# Patient Record
Sex: FEMALE | Race: WHITE | NOT HISPANIC OR LATINO | Employment: FULL TIME | URBAN - METROPOLITAN AREA
[De-identification: names, ages, dates, MRNs, and addresses within clinical notes are randomized per-mention and may not be internally consistent; named-entity substitution may affect disease eponyms.]

---

## 2023-06-19 ENCOUNTER — OFFICE VISIT (OUTPATIENT)
Dept: PRIMARY CARE | Facility: CLINIC | Age: 48
End: 2023-06-19
Payer: COMMERCIAL

## 2023-06-19 VITALS
SYSTOLIC BLOOD PRESSURE: 128 MMHG | HEART RATE: 57 BPM | OXYGEN SATURATION: 98 % | BODY MASS INDEX: 29.63 KG/M2 | DIASTOLIC BLOOD PRESSURE: 78 MMHG | WEIGHT: 162 LBS

## 2023-06-19 DIAGNOSIS — R35.0 FREQUENCY OF URINATION: ICD-10-CM

## 2023-06-19 DIAGNOSIS — N30.01 ACUTE CYSTITIS WITH HEMATURIA: Primary | ICD-10-CM

## 2023-06-19 PROBLEM — Z20.822 SUSPECTED COVID-19 VIRUS INFECTION: Status: ACTIVE | Noted: 2023-06-19

## 2023-06-19 PROBLEM — E04.9 ENLARGED THYROID: Status: ACTIVE | Noted: 2023-06-19

## 2023-06-19 PROBLEM — E78.5 HYPERLIPIDEMIA: Status: ACTIVE | Noted: 2023-06-19

## 2023-06-19 PROBLEM — M75.81 TENDINITIS OF RIGHT ROTATOR CUFF: Status: ACTIVE | Noted: 2023-06-19

## 2023-06-19 PROBLEM — R92.30 DENSE BREAST TISSUE ON MAMMOGRAM: Status: ACTIVE | Noted: 2023-06-19
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
## 2023-06-19 PROBLEM — H69.90 EUSTACHIAN TUBE DISORDER: Status: ACTIVE | Noted: 2023-06-19

## 2023-06-19 PROBLEM — R92.8 ABNORMAL MAMMOGRAM: Status: ACTIVE | Noted: 2023-06-19

## 2023-06-19 PROBLEM — N84.1 ENDOCERVICAL POLYP: Status: ACTIVE | Noted: 2023-06-19

## 2023-06-19 PROBLEM — H91.90 DECREASED HEARING: Status: ACTIVE | Noted: 2023-06-19

## 2023-06-19 PROBLEM — B37.0 CANDIDIASIS OF MOUTH: Status: ACTIVE | Noted: 2023-06-19

## 2023-06-19 PROBLEM — T78.40XA ALLERGY: Status: ACTIVE | Noted: 2023-06-19

## 2023-06-19 PROBLEM — R00.2 HEART PALPITATIONS: Status: ACTIVE | Noted: 2023-06-19

## 2023-06-19 PROBLEM — L65.9 HAIR LOSS: Status: ACTIVE | Noted: 2023-06-19

## 2023-06-19 PROBLEM — N63.20 BREAST MASS, LEFT: Status: ACTIVE | Noted: 2023-06-19

## 2023-06-19 LAB
POC APPEARANCE, URINE: CLEAR
POC BILIRUBIN, URINE: NEGATIVE
POC BLOOD, URINE: ABNORMAL
POC COLOR, URINE: YELLOW
POC GLUCOSE, URINE: NEGATIVE MG/DL
POC KETONES, URINE: NEGATIVE MG/DL
POC LEUKOCYTES, URINE: NEGATIVE
POC NITRITE,URINE: NEGATIVE
POC PH, URINE: 7 PH
POC PROTEIN, URINE: NEGATIVE MG/DL
POC SPECIFIC GRAVITY, URINE: 1.01
POC UROBILINOGEN, URINE: 0.2 EU/DL

## 2023-06-19 PROCEDURE — 87086 URINE CULTURE/COLONY COUNT: CPT

## 2023-06-19 PROCEDURE — 81002 URINALYSIS NONAUTO W/O SCOPE: CPT | Performed by: FAMILY MEDICINE

## 2023-06-19 PROCEDURE — 99213 OFFICE O/P EST LOW 20 MIN: CPT | Performed by: FAMILY MEDICINE

## 2023-06-19 RX ORDER — CHOLECALCIFEROL (VITAMIN D3) 50 MCG
TABLET ORAL
COMMUNITY
End: 2024-01-12 | Stop reason: ENTERED-IN-ERROR

## 2023-06-19 RX ORDER — CIPROFLOXACIN 500 MG/1
500 TABLET ORAL 2 TIMES DAILY
Qty: 10 TABLET | Refills: 0 | Status: SHIPPED | OUTPATIENT
Start: 2023-06-19 | End: 2023-06-24

## 2023-06-19 NOTE — PROGRESS NOTES
"Subjective   Patient ID: Aida Moreau is a 48 y.o. female who presents for blood in urine.     HPI   She had a severe sharp pain in her right low back that woke her from her sleep and lasted for 15 minutes. She states it was worse than labor pains.  She did not take anything for relief. It subsided into a dull ache. When she went to the bathroom the second time in the morning there was blood in the urine. She still has  blood in her urine. She went on a baseball trip she was drinking a lot of water, and the urine was still cloudy. She denies burning w urination but has frequency. She felt like she is \"on fire inside\" when traveling. She denies chills and fever. She reports that she did not feel well over the last few days, which she attributed to traveling.     She has never been a smoker.       Review of Systems    Objective   /78   Pulse 57   Wt 73.5 kg (162 lb)   SpO2 98%   BMI 29.63 kg/m²     Physical Exam  Constitutional:       Appearance: Normal appearance.   Cardiovascular:      Rate and Rhythm: Normal rate and regular rhythm.      Pulses: Normal pulses.      Heart sounds: Normal heart sounds.   Pulmonary:      Effort: Pulmonary effort is normal.      Breath sounds: Normal breath sounds.   Abdominal:      General: Bowel sounds are normal.      Palpations: Abdomen is soft. There is no mass.      Tenderness: There is no abdominal tenderness. There is no right CVA tenderness or left CVA tenderness.   Musculoskeletal:         General: No tenderness. Normal range of motion.      Cervical back: Normal range of motion.   Skin:     General: Skin is warm and dry.   Neurological:      Mental Status: She is alert and oriented to person, place, and time.   Psychiatric:         Mood and Affect: Mood normal.         Thought Content: Thought content normal.         Judgment: Judgment normal.         Assessment/Plan   Problem List Items Addressed This Visit    None  Visit Diagnoses       Acute cystitis with " hematuria    -  Primary    Relevant Medications    ciprofloxacin (Cipro) 500 mg tablet    Other Relevant Orders    POCT UA (nonautomated) manually resulted (Completed)    Frequency of urination        Relevant Orders    Urinalysis Microscopic Only    Urine Culture          Hematuria  Urine Sample provided of Urinalysis and culture.   Start on Cipro 500 twice daily for 5 days. Prescription sent to pharmacy.   If culture is negative, will stop antibiotic and refer to urology for investigation of hematuria.   SE profile of medication discussed    Question kidney stone as cause of back pain    Recheck urine after antibiotic if culture pos for infection       Scribe Attestation  By signing my name below, IDawn Scribe   attest that this documentation has been prepared under the direction and in the presence of Josiane Castaneda DO.

## 2023-06-19 NOTE — PATIENT INSTRUCTIONS
Hematuria  Urine Sample provided of Urinalysis and culture.   Start on Cipro 500 twice daily for 10 days. Prescription sent to pharmacy.   If culture is negative, will stop antibiotic and refer to urology.     Sulfur Allergy  Advised to call allergist for opinion before traveling.

## 2023-06-20 LAB — URINE CULTURE: NORMAL

## 2023-06-23 DIAGNOSIS — R31.0 GROSS HEMATURIA: Primary | ICD-10-CM

## 2023-06-26 ENCOUNTER — LAB (OUTPATIENT)
Dept: LAB | Facility: LAB | Age: 48
End: 2023-06-26
Payer: COMMERCIAL

## 2023-06-26 DIAGNOSIS — R31.0 GROSS HEMATURIA: ICD-10-CM

## 2023-06-26 LAB
RBC, URINE: 422 /HPF (ref 0–5)
SQUAMOUS EPITHELIAL CELLS, URINE: <1 /HPF
WBC, URINE: 4 /HPF (ref 0–5)

## 2023-06-26 PROCEDURE — 81001 URINALYSIS AUTO W/SCOPE: CPT

## 2023-06-28 ENCOUNTER — TELEPHONE (OUTPATIENT)
Dept: PRIMARY CARE | Facility: CLINIC | Age: 48
End: 2023-06-28

## 2023-06-28 DIAGNOSIS — R31.0 GROSS HEMATURIA: Primary | ICD-10-CM

## 2023-06-28 NOTE — TELEPHONE ENCOUNTER
PATIENT WENT AND HAD LABS DONE. SHE STATES THAT THERE IS STILL BLOOD IN HER URINE. PLEASE ADVISE NEXT STEPS.

## 2023-06-29 LAB
CREATININE (MG/DL) IN SER/PLAS: 0.88 MG/DL (ref 0.5–1.05)
GFR FEMALE: 82 ML/MIN/1.73M2

## 2023-09-07 ENCOUNTER — HOSPITAL ENCOUNTER (OUTPATIENT)
Dept: DATA CONVERSION | Facility: HOSPITAL | Age: 48
End: 2023-09-07
Attending: UROLOGY | Admitting: UROLOGY
Payer: COMMERCIAL

## 2023-09-07 DIAGNOSIS — N13.2 HYDRONEPHROSIS WITH RENAL AND URETERAL CALCULOUS OBSTRUCTION: ICD-10-CM

## 2023-09-07 DIAGNOSIS — N20.0 CALCULUS OF KIDNEY: ICD-10-CM

## 2023-09-13 LAB
CALCULI COMPOSITION: NORMAL
CALCULI DESCRIPTION: NORMAL
CALCULI MASS: 8 MG

## 2023-09-29 VITALS — WEIGHT: 151.24 LBS | BODY MASS INDEX: 27.83 KG/M2 | HEIGHT: 62 IN

## 2023-09-30 NOTE — H&P
History & Physical Reviewed:   Pregnant/Lactating:  ·  Are You Pregnant no   ·  Are You Currently Breastfeeding no     I have reviewed the History and Physical dated:  07-Sep-2023   History and Physical reviewed and relevant findings noted. Patient examined to review pertinent physical  findings.: No significant changes   Home Medications Reviewed: no changes noted   Allergies Reviewed: no changes noted       ERAS (Enhanced Recovery After Surgery):  ·  ERAS Patient: no     Consent:   COVID-19 Consent:  ·  COVID-19 Risk Consent Surgeon has reviewed key risks related to the risk of [place] COVID-19 and if they contract COVID-19 what the risks are.     Attestation:   Note Completion:  I am a:  Resident/Fellow   Attending Attestation I saw and evaluated the patient.  I personally obtained the key and critical portions of the history and physical exam or was physically present for key and  critical portions performed by the resident/fellow. I reviewed the resident/fellow?s documentation and discussed the patient with the resident/fellow.  I agree with the resident/fellow?s medical decision making as documented in the note.     I personally evaluated the patient on 07-Sep-2023         Electronic Signatures:  Sergo Puente (Resident))  (Signed 07-Sep-2023 05:35)   Authored: History & Physical Reviewed, ERAS, Consent,  Note Completion  Javon Westfall)  (Signed 07-Sep-2023 16:26)   Authored: Note Completion   Co-Signer: History & Physical Reviewed, ERAS, Consent, Note Completion      Last Updated: 07-Sep-2023 16:26 by Javon Westfall)

## 2023-10-01 NOTE — OP NOTE
PROCEDURE DETAILS    Preoperative Diagnosis:  Right UVJ stone, hydronephrosis  Postoperative Diagnosis:  Right UVJ stone, hydronephrosis  Surgeon: Dr. Westfall   Resident/Fellow/Other Assistant: Dr. Bobby Ham, Dr. Sergo Puente     Procedure:  Cystoscopy, right retrograde pyelogram with intraoperative interpretation, right ureteroscopy, laser lithotripsy and stone removal with basket  right stent placement  Anesthesia: General  Estimated Blood Loss: 5 cc  Findings: UOs in normal orthotopic position, no bladder masses or stones identified   Filling defect at distal right UO. Stone seen at the distal right ureter  Specimens(s) Collected: yes,  Stone fragments   Complications: None  Urine Output: Lost to field   Drains and/or Catheters: 6x26fr JJ right ureteral stent  Patient Returned To/Condition: PACU in stable condition         Operative Report:   Patient consented to procedure in preoperative area. Risks and benefits discussed. Patient was marked on the right side. Allergies were reviewed and preoperative  antibiotics were administered. Patient was brought to the operating room and placed in supine position on the operating room table. A timeout was performed. All were in agreement. Patient underwent general anesthesia without complication. They were repositioned  in dorsal lithotomy and prepped and draped in the usual sterile fashion. A 21 fr rigid cystoscope was used to perform cystourethroscopy. Urethra was normal. bilateral UOs were in normal orthotopic position. No masses or stones were identified.  Through  the right UO, a sensor wire was placed through a dual lumen catheter to the level of the kidney as confirmed on fluoroscopy. Retrograde pyelogram was performed.     Retrograde pyelogram interpretation: Ureter had normal caliber and course. Filling defect was noted distal ureter    A Then the dual lumen was removed. The cystoscope was removed while the wire was secured as a safety wire.  A semi-rigid ureteroscopy was advanced  next to the wire under direct vision. Stone was noted in the distal ureter. A 200 micron holmium laser fiber was used to fragment all stone. A basket was then used to remove remaining stone fragments. Ureteroscope was withdrawn under direct visualization.  No ureteral stones were noted on ureteroscopy. A 6x 26 JJ stent was placed over the safety wire with proximal curl noted in kidney on fluoro and distal curl in the bladder on direct visualization. Bladder was drained, instruments removed. This concluded  the procedure. Patient was awoken from anesthesia without complication and transferred to PACU in stable condition.                         Attestation:   Note Completion:  Attending Attestation I was present for the entire procedure    I am a: Resident/Fellow         Electronic Signatures:  Sergo Puente (Resident))  (Signed 07-Sep-2023 16:43)   Authored: Post-Operative Note, Chart Review, Note Completion  Javon Westfall)  (Signed 08-Sep-2023 14:13)   Authored: Post-Operative Note, Chart Review, Note Completion   Co-Signer: Post-Operative Note, Chart Review, Note Completion      Last Updated: 08-Sep-2023 14:13 by Javon Westfall)

## 2023-12-13 ENCOUNTER — TELEPHONE (OUTPATIENT)
Dept: PRIMARY CARE | Facility: CLINIC | Age: 48
End: 2023-12-13
Payer: COMMERCIAL

## 2023-12-14 DIAGNOSIS — Z00.00 PHYSICAL EXAM: Primary | ICD-10-CM

## 2023-12-14 DIAGNOSIS — E55.9 VITAMIN D DEFICIENCY: ICD-10-CM

## 2023-12-15 NOTE — TELEPHONE ENCOUNTER
Spoke to patient to let her know orders in and she can go at anytime and let her know results will post to her chart before being reviewed at her appt

## 2024-01-03 ENCOUNTER — LAB (OUTPATIENT)
Dept: LAB | Facility: LAB | Age: 49
End: 2024-01-03
Payer: COMMERCIAL

## 2024-01-03 DIAGNOSIS — Z00.00 PHYSICAL EXAM: ICD-10-CM

## 2024-01-03 DIAGNOSIS — E55.9 VITAMIN D DEFICIENCY: ICD-10-CM

## 2024-01-03 LAB
25(OH)D3 SERPL-MCNC: 27 NG/ML (ref 30–100)
ALBUMIN SERPL BCP-MCNC: 4.3 G/DL (ref 3.4–5)
ALP SERPL-CCNC: 59 U/L (ref 33–110)
ALT SERPL W P-5'-P-CCNC: 40 U/L (ref 7–45)
ANION GAP SERPL CALC-SCNC: 13 MMOL/L (ref 10–20)
AST SERPL W P-5'-P-CCNC: 24 U/L (ref 9–39)
BASOPHILS # BLD AUTO: 0.04 X10*3/UL (ref 0–0.1)
BASOPHILS NFR BLD AUTO: 0.9 %
BILIRUB SERPL-MCNC: 0.4 MG/DL (ref 0–1.2)
BUN SERPL-MCNC: 19 MG/DL (ref 6–23)
CALCIUM SERPL-MCNC: 9.5 MG/DL (ref 8.6–10.6)
CHLORIDE SERPL-SCNC: 106 MMOL/L (ref 98–107)
CHOLEST SERPL-MCNC: 255 MG/DL (ref 0–199)
CHOLESTEROL/HDL RATIO: 3
CO2 SERPL-SCNC: 24 MMOL/L (ref 21–32)
CREAT SERPL-MCNC: 0.73 MG/DL (ref 0.5–1.05)
EOSINOPHIL # BLD AUTO: 0.08 X10*3/UL (ref 0–0.7)
EOSINOPHIL NFR BLD AUTO: 1.8 %
ERYTHROCYTE [DISTWIDTH] IN BLOOD BY AUTOMATED COUNT: 11.5 % (ref 11.5–14.5)
GFR SERPL CREATININE-BSD FRML MDRD: >90 ML/MIN/1.73M*2
GLUCOSE SERPL-MCNC: 105 MG/DL (ref 74–99)
HCT VFR BLD AUTO: 42.6 % (ref 36–46)
HDLC SERPL-MCNC: 85.8 MG/DL
HGB BLD-MCNC: 14.1 G/DL (ref 12–16)
IMM GRANULOCYTES # BLD AUTO: 0.01 X10*3/UL (ref 0–0.7)
IMM GRANULOCYTES NFR BLD AUTO: 0.2 % (ref 0–0.9)
LDLC SERPL CALC-MCNC: 148 MG/DL
LYMPHOCYTES # BLD AUTO: 1.28 X10*3/UL (ref 1.2–4.8)
LYMPHOCYTES NFR BLD AUTO: 29.4 %
MCH RBC QN AUTO: 29 PG (ref 26–34)
MCHC RBC AUTO-ENTMCNC: 33.1 G/DL (ref 32–36)
MCV RBC AUTO: 88 FL (ref 80–100)
MONOCYTES # BLD AUTO: 0.31 X10*3/UL (ref 0.1–1)
MONOCYTES NFR BLD AUTO: 7.1 %
NEUTROPHILS # BLD AUTO: 2.64 X10*3/UL (ref 1.2–7.7)
NEUTROPHILS NFR BLD AUTO: 60.6 %
NON HDL CHOLESTEROL: 169 MG/DL (ref 0–149)
NRBC BLD-RTO: 0 /100 WBCS (ref 0–0)
PLATELET # BLD AUTO: 266 X10*3/UL (ref 150–450)
POTASSIUM SERPL-SCNC: 4.5 MMOL/L (ref 3.5–5.3)
PROT SERPL-MCNC: 6.8 G/DL (ref 6.4–8.2)
RBC # BLD AUTO: 4.87 X10*6/UL (ref 4–5.2)
SODIUM SERPL-SCNC: 138 MMOL/L (ref 136–145)
TRIGL SERPL-MCNC: 105 MG/DL (ref 0–149)
TSH SERPL-ACNC: 1.77 MIU/L (ref 0.44–3.98)
VLDL: 21 MG/DL (ref 0–40)
WBC # BLD AUTO: 4.4 X10*3/UL (ref 4.4–11.3)

## 2024-01-03 PROCEDURE — 84443 ASSAY THYROID STIM HORMONE: CPT

## 2024-01-03 PROCEDURE — 80061 LIPID PANEL: CPT

## 2024-01-03 PROCEDURE — 85025 COMPLETE CBC W/AUTO DIFF WBC: CPT

## 2024-01-03 PROCEDURE — 82306 VITAMIN D 25 HYDROXY: CPT

## 2024-01-03 PROCEDURE — 80053 COMPREHEN METABOLIC PANEL: CPT

## 2024-01-03 PROCEDURE — 36415 COLL VENOUS BLD VENIPUNCTURE: CPT

## 2024-01-12 ENCOUNTER — OFFICE VISIT (OUTPATIENT)
Dept: NEPHROLOGY | Facility: CLINIC | Age: 49
End: 2024-01-12
Payer: COMMERCIAL

## 2024-01-12 VITALS
SYSTOLIC BLOOD PRESSURE: 124 MMHG | BODY MASS INDEX: 32.29 KG/M2 | HEIGHT: 62 IN | WEIGHT: 175.49 LBS | HEART RATE: 67 BPM | DIASTOLIC BLOOD PRESSURE: 79 MMHG | OXYGEN SATURATION: 98 %

## 2024-01-12 DIAGNOSIS — N20.0 NEPHROLITHIASIS: Primary | ICD-10-CM

## 2024-01-12 PROCEDURE — 99203 OFFICE O/P NEW LOW 30 MIN: CPT | Performed by: INTERNAL MEDICINE

## 2024-01-12 NOTE — PROGRESS NOTES
47 yo CF  No urinary symptoms     and 2023  Dr. GIOVANA Marsh once a month  No herbal    PMH  Ozempemic 0.025 q weekly       SOCHx  No tob   No etoh  Lives with  and 2 kids   at elementary school    FMHx  DM, nephrolithiasis, heart disease    ROS OTW Neg 10/14 systems except as above    NAD  Scl AI s inj  MMM, no sores  Deferred secondary to COVID  No edema  No tremor  No rash  Appropriate    Nephrolithiasis    Discussed urine output and dietary salt goals  24 hour urine   Review over virtual visit in 2-4 weeks

## 2024-01-15 ENCOUNTER — LAB (OUTPATIENT)
Dept: LAB | Facility: LAB | Age: 49
End: 2024-01-15
Payer: COMMERCIAL

## 2024-01-15 DIAGNOSIS — N20.0 NEPHROLITHIASIS: ICD-10-CM

## 2024-01-15 PROCEDURE — 82436 ASSAY OF URINE CHLORIDE: CPT

## 2024-01-15 PROCEDURE — 82507 ASSAY OF CITRATE: CPT

## 2024-01-15 PROCEDURE — 82570 ASSAY OF URINE CREATININE: CPT

## 2024-01-15 PROCEDURE — 84540 ASSAY OF URINE/UREA-N: CPT

## 2024-01-15 PROCEDURE — 83986 ASSAY PH BODY FLUID NOS: CPT

## 2024-01-15 PROCEDURE — 84560 ASSAY OF URINE/URIC ACID: CPT

## 2024-01-15 PROCEDURE — 83935 ASSAY OF URINE OSMOLALITY: CPT

## 2024-01-15 PROCEDURE — 84392 ASSAY OF URINE SULFATE: CPT

## 2024-01-15 PROCEDURE — 82140 ASSAY OF AMMONIA: CPT

## 2024-01-15 PROCEDURE — 83735 ASSAY OF MAGNESIUM: CPT

## 2024-01-15 PROCEDURE — 84300 ASSAY OF URINE SODIUM: CPT

## 2024-01-15 PROCEDURE — 83945 ASSAY OF OXALATE: CPT

## 2024-01-15 PROCEDURE — 84133 ASSAY OF URINE POTASSIUM: CPT

## 2024-01-15 PROCEDURE — 82340 ASSAY OF CALCIUM IN URINE: CPT

## 2024-01-19 ENCOUNTER — APPOINTMENT (OUTPATIENT)
Dept: NEPHROLOGY | Facility: CLINIC | Age: 49
End: 2024-01-19

## 2024-01-20 LAB
AMMONIA 24H UR-SRATE: 59 MMOL/24 H (ref 15–56)
BRUSHITE CRYSTAL(MAYO): -1.19 DG
BSA: ABNORMAL 1.73M(2)
CALCIUM 24H UR-MRATE: 216 MG/24 H
CAOX 24H ENGDIFF UR: 1.32 DG
CHLORIDE 24H UR-SRATE: 111 MMOL/24 H (ref 34–286)
CITRATE 24H UR-MRATE: 1088 MG/24 H (ref 335–1191)
COLLECT DURATION TIME UR: 24 H
CREAT 24H UR-MRATE: 1998 MG/24 H (ref 603–1783)
HYDROXYAPATITE 24H ENGDIFF UR: 2.06 DG
MAGNESIUM 24H UR-MRATE: 108 MG/24 H (ref 51–269)
OSMOLALITY 24H UR: 361 MOSM/KG (ref 150–1150)
OXALATE 24H UR-MRATE: 30.8 MG/24 H (ref 9.7–40.5)
OXALATE 24H UR-SRATE: 0.35 MMOL/24 H (ref 0.11–0.46)
PH 24H UR: 5.7 [PH] (ref 4.5–8)
PHOSPHATE 24H UR-MRATE: 1269 MG/24 H (ref 226–1797)
POTASSIUM 24H UR-SRATE: 62 MMOL/24 H (ref 16–105)
PROTEIN CATABOLIC RATE 24H UR-MRATE: 110 G/24 H (ref 56–125)
SODIUM 24H UR-SRATE: 105 MMOL/24 H (ref 22–328)
SPECIMEN VOL 24H UR: 2700 ML
SULFATE 24H UR-SRATE: 20 MMOL/24 H (ref 7–47)
URATE 24H UR-MRATE: 675 MG/24 H (ref 250–750)
URIC ACID CRYSTAL(MAYO): 1.38 DG
URINALYSIS SPECIALIST REVIEW: ABNORMAL
UUN 24H UR-MRATE: 13.6 G/24 H (ref 7–42)

## 2024-02-12 ENCOUNTER — OFFICE VISIT (OUTPATIENT)
Dept: PRIMARY CARE | Facility: CLINIC | Age: 49
End: 2024-02-12
Payer: COMMERCIAL

## 2024-02-12 VITALS
HEART RATE: 70 BPM | RESPIRATION RATE: 16 BRPM | WEIGHT: 170 LBS | OXYGEN SATURATION: 93 % | DIASTOLIC BLOOD PRESSURE: 81 MMHG | SYSTOLIC BLOOD PRESSURE: 124 MMHG | HEIGHT: 62 IN | TEMPERATURE: 97.7 F | BODY MASS INDEX: 31.28 KG/M2

## 2024-02-12 DIAGNOSIS — Z12.11 COLON CANCER SCREENING: ICD-10-CM

## 2024-02-12 DIAGNOSIS — E88.819 INSULIN RESISTANCE: ICD-10-CM

## 2024-02-12 DIAGNOSIS — R63.5 WEIGHT GAIN: ICD-10-CM

## 2024-02-12 DIAGNOSIS — Z00.00 PHYSICAL EXAM: Primary | ICD-10-CM

## 2024-02-12 DIAGNOSIS — N20.0 NEPHROLITHIASIS: ICD-10-CM

## 2024-02-12 PROCEDURE — 99396 PREV VISIT EST AGE 40-64: CPT | Performed by: FAMILY MEDICINE

## 2024-02-12 PROCEDURE — 1036F TOBACCO NON-USER: CPT | Performed by: FAMILY MEDICINE

## 2024-02-12 RX ORDER — SEMAGLUTIDE 1.34 MG/ML
0.25 INJECTION, SOLUTION SUBCUTANEOUS
Qty: 1.5 ML | Refills: 0
Start: 2024-02-12

## 2024-02-12 RX ORDER — BRAN/GUM/FIB/CEL/PSYL/KELP/PEC 1000 MG
TABLET ORAL
COMMUNITY

## 2024-02-12 RX ORDER — NICOTINE POLACRILEX 2 MG
GUM BUCCAL
COMMUNITY

## 2024-02-12 ASSESSMENT — PATIENT HEALTH QUESTIONNAIRE - PHQ9
10. IF YOU CHECKED OFF ANY PROBLEMS, HOW DIFFICULT HAVE THESE PROBLEMS MADE IT FOR YOU TO DO YOUR WORK, TAKE CARE OF THINGS AT HOME, OR GET ALONG WITH OTHER PEOPLE: NOT DIFFICULT AT ALL
8. MOVING OR SPEAKING SO SLOWLY THAT OTHER PEOPLE COULD HAVE NOTICED. OR THE OPPOSITE, BEING SO FIGETY OR RESTLESS THAT YOU HAVE BEEN MOVING AROUND A LOT MORE THAN USUAL: NOT AT ALL
2. FEELING DOWN, DEPRESSED OR HOPELESS: SEVERAL DAYS
9. THOUGHTS THAT YOU WOULD BE BETTER OFF DEAD, OR OF HURTING YOURSELF: NOT AT ALL
SUM OF ALL RESPONSES TO PHQ QUESTIONS 1-9: 7
6. FEELING BAD ABOUT YOURSELF - OR THAT YOU ARE A FAILURE OR HAVE LET YOURSELF OR YOUR FAMILY DOWN: SEVERAL DAYS
3. TROUBLE FALLING OR STAYING ASLEEP OR SLEEPING TOO MUCH: SEVERAL DAYS
7. TROUBLE CONCENTRATING ON THINGS, SUCH AS READING THE NEWSPAPER OR WATCHING TELEVISION: SEVERAL DAYS
5. POOR APPETITE OR OVEREATING: SEVERAL DAYS
1. LITTLE INTEREST OR PLEASURE IN DOING THINGS: SEVERAL DAYS
SUM OF ALL RESPONSES TO PHQ9 QUESTIONS 1 AND 2: 2
4. FEELING TIRED OR HAVING LITTLE ENERGY: SEVERAL DAYS

## 2024-02-12 ASSESSMENT — ANXIETY QUESTIONNAIRES
1. FEELING NERVOUS, ANXIOUS, OR ON EDGE: SEVERAL DAYS
2. NOT BEING ABLE TO STOP OR CONTROL WORRYING: SEVERAL DAYS
3. WORRYING TOO MUCH ABOUT DIFFERENT THINGS: SEVERAL DAYS
IF YOU CHECKED OFF ANY PROBLEMS ON THIS QUESTIONNAIRE, HOW DIFFICULT HAVE THESE PROBLEMS MADE IT FOR YOU TO DO YOUR WORK, TAKE CARE OF THINGS AT HOME, OR GET ALONG WITH OTHER PEOPLE: NOT DIFFICULT AT ALL
7. FEELING AFRAID AS IF SOMETHING AWFUL MIGHT HAPPEN: SEVERAL DAYS
6. BECOMING EASILY ANNOYED OR IRRITABLE: SEVERAL DAYS
5. BEING SO RESTLESS THAT IT IS HARD TO SIT STILL: SEVERAL DAYS
4. TROUBLE RELAXING: SEVERAL DAYS
GAD7 TOTAL SCORE: 7

## 2024-02-12 ASSESSMENT — PAIN SCALES - GENERAL: PAINLEVEL: 0-NO PAIN

## 2024-02-12 NOTE — PROGRESS NOTES
"Subjective   Patient ID: Aida Moreau is a 48 y.o. female who presents for cpe.    HPI   The patient presents to the clinic for an annual physical exam. She has past medical history of heart palpitations, eustachian tube disorder, enlarged thyroid, and tendinitis of right rotator cuff.    The patient reports symptoms of chronic fatigue. Notably, she had surgery to remove a kidney stone on 09/07/2023. She continues to follow-up with a nephrologist (Dr. De Luna) regarding this condition.    She reports that she does not take a vitamin D3 supplement. Has low D level, otherwise normal labs    The patient had labs done on 01/03/2024 and requests to have them reviewed. She states that she does not eat a high-carbohydrate diet. She believes that she has always had difficulty with insulin resistance. She recalls that she was taking metformin medication previously for PCOS. She states that she started taking a low dose of Ozempic (0.25 mg weekly) for weight loss after consulting with [facility] and she has not noticed a difference in weight (current weight: 170 lbs, up 8 lbs from June 2023). She denies any side-effects to Ozempic injections.    The patient wonders if her fatigue and lack of weight loss may be secondary to entering menopause. She reports that she was perimenopause in 2019. She continues to visit her OB-GYN (Dr. Phan) for gynecological exams (Pap smear) regularly.    Review of Systems    Objective   /81   Pulse 70   Temp 36.5 °C (97.7 °F)   Resp 16   Ht 1.575 m (5' 2\")   Wt 77.1 kg (170 lb)   SpO2 93%   BMI 31.09 kg/m²     Physical Exam  Constitutional:       Appearance: Normal appearance.   HENT:      Head: Normocephalic.      Right Ear: Tympanic membrane normal.      Left Ear: Tympanic membrane normal.      Mouth/Throat:      Pharynx: Oropharynx is clear.   Eyes:      Extraocular Movements: Extraocular movements intact.      Pupils: Pupils are equal, round, and reactive to " light.   Cardiovascular:      Rate and Rhythm: Normal rate and regular rhythm.      Pulses: Normal pulses.      Heart sounds: Normal heart sounds.   Pulmonary:      Effort: Pulmonary effort is normal.      Breath sounds: Normal breath sounds.   Abdominal:      General: Bowel sounds are normal.      Palpations: Abdomen is soft. There is no mass.      Tenderness: There is no abdominal tenderness.   Musculoskeletal:         General: Normal range of motion.      Cervical back: Normal range of motion and neck supple.      Right lower leg: No edema.      Left lower leg: No edema.   Lymphadenopathy:      Cervical: No cervical adenopathy.   Skin:     General: Skin is warm and dry.   Neurological:      General: No focal deficit present.      Mental Status: She is alert and oriented to person, place, and time.   Psychiatric:         Mood and Affect: Mood normal.         Thought Content: Thought content normal.         Judgment: Judgment normal.         Assessment/Plan   Problem List Items Addressed This Visit    None  Visit Diagnoses         Codes    Physical exam    -  Primary Z00.00    Colon cancer screening     Z12.11    Relevant Orders    Colonoscopy Screening; Average Risk Patient    Weight gain     R63.5    Insulin resistance     E88.819    Nephrolithiasis     N20.0               The patient had labs done on 01/03/2024 and requests to have them reviewed. Blood count was WNL. Chemistries were mostly WNL, but glucose (105) was elevated. Kidney function markers and liver enzymes were WNL. Cholesterol results were mostly WNL but total cholesterol (255), LDL (148), and non-HDL cholesterol (169) were elevated. She was advised to eat a low-fat diet with aerobic exercise. Her TSH (1.77) was within normal range. Her vitamin D (27) was insufficient. The patient was instructed to start taking 2000 IU of vitamin D3 daily.     She is up-to-date on gynecological exam (February 2023) and mammogram screening (March 2023). Following  consultation, a colonoscopy screening was ordered for the patient.    In regards to concerns with chronic fatigue, the patient was advised that it is unlikely that this condition is secondary to her kidney stone surgery in September 2023. She was advised that her symptoms may be partially secondary to insufficient vitamin D levels. Consequently, she was instructed to start taking 2000 IU of vitamin D3 daily. She was advised to continue monitoring symptoms for improvement/exacerbation.    Also may be due to anxiety and depression, pt feels fatigue makes her feels low    A comprehensive physical exam was conducted on the patient.     Follow up 2-3 months if s/s not improving    Scribe Attestation  By signing my name below, IVinnie Scribe   attest that this documentation has been prepared under the direction and in the presence of Josiane Castaneda DO.

## 2024-02-13 PROCEDURE — 87186 SC STD MICRODIL/AGAR DIL: CPT

## 2024-02-13 PROCEDURE — 87086 URINE CULTURE/COLONY COUNT: CPT

## 2024-02-14 ENCOUNTER — LAB REQUISITION (OUTPATIENT)
Dept: LAB | Facility: HOSPITAL | Age: 49
End: 2024-02-14
Payer: COMMERCIAL

## 2024-02-14 DIAGNOSIS — N39.0 URINARY TRACT INFECTION, SITE NOT SPECIFIED: ICD-10-CM

## 2024-02-16 LAB — BACTERIA UR CULT: ABNORMAL

## 2024-02-26 ENCOUNTER — TELEMEDICINE (OUTPATIENT)
Dept: NEPHROLOGY | Facility: CLINIC | Age: 49
End: 2024-02-26
Payer: COMMERCIAL

## 2024-02-26 DIAGNOSIS — N20.0 NEPHROLITHIASIS: Primary | ICD-10-CM

## 2024-02-26 PROCEDURE — 1036F TOBACCO NON-USER: CPT | Performed by: INTERNAL MEDICINE

## 2024-02-26 PROCEDURE — 99213 OFFICE O/P EST LOW 20 MIN: CPT | Performed by: INTERNAL MEDICINE

## 2024-02-26 NOTE — PROGRESS NOTES
An interactive audio and video telecommunication system which permits real time communications between the patient (at the originating site) and provider (at the distant site) was utilized to provide this telehealth service.  Verbal consent was requested and obtained from patient on this date for a telehealth visit.    Episode of hematuria / UTI earlier this month, now resolved  No stone recovered  No recent hospitalizations  No other urinary symptoms     Weight stable  NAD, sclera AI s inj  No tremor  No edema  Appropriate    Nephrolithiasis  Vit D def    Reviewed 24 hour urine  Agree with OTC vit D dose  No changes to medications  Reviewed low Na diet  Repeat 24 hour urine, labs, visit in 3 months

## 2024-06-12 ENCOUNTER — LAB REQUISITION (OUTPATIENT)
Dept: LAB | Facility: HOSPITAL | Age: 49
End: 2024-06-12
Payer: COMMERCIAL

## 2024-06-12 DIAGNOSIS — N39.0 URINARY TRACT INFECTION, SITE NOT SPECIFIED: ICD-10-CM

## 2024-06-12 PROCEDURE — 87181 SC STD AGAR DILUTION PER AGT: CPT

## 2024-06-12 PROCEDURE — 87086 URINE CULTURE/COLONY COUNT: CPT

## 2024-06-12 PROCEDURE — 87186 SC STD MICRODIL/AGAR DIL: CPT

## 2024-06-16 LAB — BACTERIA UR CULT: ABNORMAL

## 2024-06-17 ENCOUNTER — LAB (OUTPATIENT)
Dept: LAB | Facility: LAB | Age: 49
End: 2024-06-17
Payer: COMMERCIAL

## 2024-06-17 DIAGNOSIS — N20.0 NEPHROLITHIASIS: ICD-10-CM

## 2024-06-17 LAB
25(OH)D3 SERPL-MCNC: 33 NG/ML (ref 30–100)
ALBUMIN SERPL BCP-MCNC: 4.4 G/DL (ref 3.4–5)
ANION GAP SERPL CALC-SCNC: 15 MMOL/L (ref 10–20)
BUN SERPL-MCNC: 12 MG/DL (ref 6–23)
CALCIUM SERPL-MCNC: 9.6 MG/DL (ref 8.6–10.6)
CHLORIDE SERPL-SCNC: 104 MMOL/L (ref 98–107)
CO2 SERPL-SCNC: 24 MMOL/L (ref 21–32)
CREAT SERPL-MCNC: 0.73 MG/DL (ref 0.5–1.05)
EGFRCR SERPLBLD CKD-EPI 2021: >90 ML/MIN/1.73M*2
GLUCOSE SERPL-MCNC: 86 MG/DL (ref 74–99)
PHOSPHATE SERPL-MCNC: 4.2 MG/DL (ref 2.5–4.9)
POTASSIUM SERPL-SCNC: 4.8 MMOL/L (ref 3.5–5.3)
PTH-INTACT SERPL-MCNC: 71.8 PG/ML (ref 18.5–88)
SODIUM SERPL-SCNC: 138 MMOL/L (ref 136–145)

## 2024-06-17 PROCEDURE — 82306 VITAMIN D 25 HYDROXY: CPT

## 2024-06-17 PROCEDURE — 83970 ASSAY OF PARATHORMONE: CPT

## 2024-06-17 PROCEDURE — 36415 COLL VENOUS BLD VENIPUNCTURE: CPT

## 2024-06-17 PROCEDURE — 80069 RENAL FUNCTION PANEL: CPT

## 2024-06-24 ENCOUNTER — LAB (OUTPATIENT)
Dept: LAB | Facility: LAB | Age: 49
End: 2024-06-24
Payer: COMMERCIAL

## 2024-06-24 ENCOUNTER — APPOINTMENT (OUTPATIENT)
Dept: NEPHROLOGY | Facility: CLINIC | Age: 49
End: 2024-06-24
Payer: COMMERCIAL

## 2024-06-24 DIAGNOSIS — N20.0 NEPHROLITHIASIS: ICD-10-CM

## 2024-06-24 PROCEDURE — 84540 ASSAY OF URINE/UREA-N: CPT

## 2024-06-24 PROCEDURE — 83735 ASSAY OF MAGNESIUM: CPT

## 2024-06-24 PROCEDURE — 82570 ASSAY OF URINE CREATININE: CPT

## 2024-06-24 PROCEDURE — 82340 ASSAY OF CALCIUM IN URINE: CPT

## 2024-06-24 PROCEDURE — 82140 ASSAY OF AMMONIA: CPT

## 2024-06-24 PROCEDURE — 82436 ASSAY OF URINE CHLORIDE: CPT

## 2024-06-24 PROCEDURE — 82507 ASSAY OF CITRATE: CPT

## 2024-06-24 PROCEDURE — 84133 ASSAY OF URINE POTASSIUM: CPT

## 2024-06-24 PROCEDURE — 84392 ASSAY OF URINE SULFATE: CPT

## 2024-06-24 PROCEDURE — 84300 ASSAY OF URINE SODIUM: CPT

## 2024-06-24 PROCEDURE — 83986 ASSAY PH BODY FLUID NOS: CPT

## 2024-06-24 PROCEDURE — 83945 ASSAY OF OXALATE: CPT

## 2024-06-24 PROCEDURE — 83935 ASSAY OF URINE OSMOLALITY: CPT

## 2024-06-24 PROCEDURE — 84560 ASSAY OF URINE/URIC ACID: CPT

## 2024-06-26 LAB
AMMONIA 24H UR-SRATE: 29 MMOL/24 H (ref 15–56)
BRUSHITE CRYSTAL(MAYO): -2.2 DG
BSA: ABNORMAL 1.73M(2)
CALCIUM 24H UR-MRATE: 110 MG/24 H
CAOX 24H ENGDIFF UR: 0.32 DG
CHLORIDE 24H UR-SRATE: 57 MMOL/24 H (ref 34–286)
CITRATE 24H UR-MRATE: 795 MG/24 H (ref 342–1191)
COLLECT DURATION TIME UR: 24 H
CREAT 24H UR-MRATE: 1099 MG/24 H (ref 603–1783)
HYDROXYAPATITE 24H ENGDIFF UR: 1.6 DG
MAGNESIUM 24H UR-MRATE: <44 MG/24 H (ref 51–269)
OSMOLALITY 24H UR: 241 MOSM/KG (ref 150–1150)
OXALATE 24H UR-MRATE: 15.8 MG/24 H (ref 9.7–40.5)
OXALATE 24H UR-SRATE: 0.18 MMOL/24 H (ref 0.11–0.46)
PH 24H UR: 5.9 [PH] (ref 4.5–8)
PHOSPHATE 24H UR-MRATE: 527 MG/24 H (ref 226–1797)
POTASSIUM 24H UR-SRATE: 31 MMOL/24 H (ref 16–105)
PROTEIN CATABOLIC RATE 24H UR-MRATE: 65 G/24 H (ref 56–125)
SODIUM 24H UR-SRATE: 77 MMOL/24 H (ref 22–328)
SPECIMEN VOL 24H UR: 2197 ML
SULFATE 24H UR-SRATE: 13 MMOL/24 H (ref 7–47)
URATE 24H UR-MRATE: 330 MG/24 H (ref 250–750)
URIC ACID CRYSTAL(MAYO): -0.79 DG
URINALYSIS SPECIALIST REVIEW: ABNORMAL
UUN 24H UR-MRATE: 6.4 G/24 H (ref 7–42)

## 2024-06-27 ENCOUNTER — APPOINTMENT (OUTPATIENT)
Dept: NEPHROLOGY | Facility: CLINIC | Age: 49
End: 2024-06-27
Payer: COMMERCIAL

## 2024-06-27 DIAGNOSIS — N20.0 NEPHROLITHIASIS: Primary | ICD-10-CM

## 2024-06-27 PROCEDURE — 99213 OFFICE O/P EST LOW 20 MIN: CPT | Performed by: INTERNAL MEDICINE

## 2024-06-27 NOTE — PROGRESS NOTES
An interactive audio and video telecommunication system which permits real time communications between the patient (at the originating site) and provider (at the distant site) was utilized to provide this telehealth service.  Verbal consent was requested and obtained from patient on this date for a telehealth visit.    Recent UTI in June and Feb  Kidney pain, urine cloudy, hematuria  No stone recovered  Upper R back pain  No recent hospitalizations  No other urinary symptoms     Weight stable  NAD, sclera AI s inj  No tremor  No edema  Appropriate    Nephrolithiasis    Reviewed 24 hour urine  Needs increase in fluids    Renal ultrasound  No changes to medications  Repeat 24 hour urine, labs, visit in 3 months

## 2024-07-10 ENCOUNTER — HOSPITAL ENCOUNTER (OUTPATIENT)
Dept: RADIOLOGY | Facility: CLINIC | Age: 49
Discharge: HOME | End: 2024-07-10
Payer: COMMERCIAL

## 2024-07-10 DIAGNOSIS — N20.0 NEPHROLITHIASIS: ICD-10-CM

## 2024-07-10 PROCEDURE — 76770 US EXAM ABDO BACK WALL COMP: CPT

## 2024-07-10 PROCEDURE — 76770 US EXAM ABDO BACK WALL COMP: CPT | Performed by: RADIOLOGY

## 2024-07-21 ENCOUNTER — LAB REQUISITION (OUTPATIENT)
Dept: LAB | Facility: HOSPITAL | Age: 49
End: 2024-07-21
Payer: COMMERCIAL

## 2024-07-21 DIAGNOSIS — N39.0 URINARY TRACT INFECTION, SITE NOT SPECIFIED: ICD-10-CM

## 2024-07-21 PROCEDURE — 87086 URINE CULTURE/COLONY COUNT: CPT

## 2024-07-21 PROCEDURE — 87186 SC STD MICRODIL/AGAR DIL: CPT

## 2024-07-23 ENCOUNTER — APPOINTMENT (OUTPATIENT)
Dept: OBSTETRICS AND GYNECOLOGY | Facility: CLINIC | Age: 49
End: 2024-07-23
Payer: COMMERCIAL

## 2024-07-23 VITALS — HEIGHT: 62 IN | WEIGHT: 141 LBS | BODY MASS INDEX: 25.95 KG/M2

## 2024-07-23 DIAGNOSIS — Z12.31 ENCOUNTER FOR SCREENING MAMMOGRAM FOR BREAST CANCER: ICD-10-CM

## 2024-07-23 DIAGNOSIS — Z01.419 WELL WOMAN EXAM: ICD-10-CM

## 2024-07-23 PROCEDURE — 3008F BODY MASS INDEX DOCD: CPT | Performed by: OBSTETRICS & GYNECOLOGY

## 2024-07-23 PROCEDURE — 88175 CYTOPATH C/V AUTO FLUID REDO: CPT

## 2024-07-23 PROCEDURE — 1036F TOBACCO NON-USER: CPT | Performed by: OBSTETRICS & GYNECOLOGY

## 2024-07-23 PROCEDURE — 99396 PREV VISIT EST AGE 40-64: CPT | Performed by: OBSTETRICS & GYNECOLOGY

## 2024-07-23 PROCEDURE — 87624 HPV HI-RISK TYP POOLED RSLT: CPT

## 2024-07-23 ASSESSMENT — PAIN SCALES - GENERAL: PAINLEVEL: 0-NO PAIN

## 2024-07-23 NOTE — PROGRESS NOTES
"Aida Moreau is a 49 y.o. female who is here for a routine exam. PCP = Josiane Castaneda, DO  Used GLP-1 medication and lost 35 pounds  PCOS symptoms resolved while on medication but now resuming once she stopped medication even though she has not regained the weight    Menses : ablation  Contraception : tubal sterilization  HPV vaccine : No  Last pap : 2023 normal  Last HPV : 2021 negative  History of abnormal pap : No  Last mammogram : 2023 normal  History of abnormal mammogram : Yes, describe: Left breast fibroadenoma  Colon cancer screen :     ROS  systems reviewed, anything negative noted in HPI    bladder: no dysuria, gross hematuria, urinary frequency, urgency or incontinence  breast: no lumps, nipple d/c, overlying skin changes, redness, or skin retraction    [unfilled]    Past med hx and past surg hx reviewed and notable for:     Objective   Ht 1.575 m (5' 2\")   Wt 64 kg (141 lb)   BMI 25.79 kg/m²      General:   Alert and oriented, in no acute distress   Neck: Supple. No visible thyromegaly.    Breast/Axilla: Normal to palpation bilaterally without masses, skin changes, lymphadenopathy, or nipple discharge.    Abdomen: Soft, non-tender, without masses or organomegaly   Vulva: Normal architecture without erythema, masses, or lesions.    Vagina: Normal mucosa without lesions, masses, or atrophy. No abnormal vaginal discharge.    Cervix: Normal without masses, lesions, or signs of cervicitis.    Uterus: Normal mobile, non-enlarged uterus    Adnexa: Normal without masses or lesions   Pelvic Floor No POP noted.    Psych Normal affect. Normal mood.      Thank you for coming to your annual exam. Your findings during the exam were normal. Specific topics addressed during this exam included: healthy lifestyle, well woman screening guidelines,     Actions performed during this visit include:  - Clinical breast exam  - Clinical pelvic exam  - pap/hpv  Orders Placed This Encounter   Procedures    BI mammo " bilateral screening tomosynthesis           Please return for your next visit in 1 year.

## 2024-07-24 LAB — BACTERIA UR CULT: ABNORMAL

## 2024-07-26 NOTE — PROGRESS NOTES
[facility]  Outpatient Clinic Note    Chief Complaint   Patient presents with    Follow-up     Subjective   Aida Moreau is a 49 y.o. female presenting for recurrent UTI.     History of Present Illness:  Patient with a history of right ureteral stone, followed by Dr. Westfall.  Last seen 9/19/2023. At that time underwent cystoscopy with right stent removal. S/p right ureteroscopy and laser lithotripsy 9/7/23.  After that visit plan was to follow-up with nephrology, return in 3 months with X-ray and ultrasound prior. Had renal US for flank pain 7/10/24, no evidence of stones or hydro.     Today she reports recurrent UTI x 6-7 years. Have acutely worsened, 3 UTIs within the past 6 months. Typical symptoms include dysuria, urinary urgency, abdominal pressure, gross hematuria, cloudy urine. No gross hematuria outside of UTI or kidney stones. Asymptomatic today. Just finished Cipro this morning. Has been taking pre/probiotic. Taking AZO.   Denies fevers, chills, nausea or vomiting. Reports right sided flank pain that is chronic since stent removal last year, worse with sitting and pressure. She is not taking any pain medication. Sexually active, unsure if UTIs related to intercourse, thinks possibly yes. No new partners. Practices proper hygiene. No changes in soaps, detergents.   Voiding 4x/day. Nocturia 0-1x/night.  Fluid intake: ~50oz water, 2 cups green tea, no pop, coffee, social alcohol.     S/p endometrial ablation. Perimenopausal. Follows with gynecology. Had annual exam 7/23/24.     Experiences urge incontinence only in setting of kidney stone. Has only happened a handful of time. Associates with Flomax. Not happening currently. Can experience DELISA with sneeze.   Denies prolapse symptoms.   Feels like emptying bladder well. No trouble starting urine stream.   Denies constipation or diarrhea. Denies fecal incontinence.     Past Medical History and Surgical History   Past Medical History:    Diagnosis Date    History of PCOS     Personal history of other diseases of the respiratory system 2020    History of sore throat     Past Surgical History:   Procedure Laterality Date    OTHER SURGICAL HISTORY  2021    Endometrial ablation    OTHER SURGICAL HISTORY  2021    Tubal ligation    OTHER SURGICAL HISTORY  2021     section    OTHER SURGICAL HISTORY  2021    Foot surgery     Medications  Current Outpatient Medications on File Prior to Visit   Medication Sig Dispense Refill    biotin 1 mg capsule Take by mouth.      bran-gum-fib-sarai-psyl-kelp-pec (Fiber 6) 1,000 mg tablet Take by mouth.      semaglutide (Ozempic) 0.25 mg or 0.5 mg(2 mg/1.5 mL) pen injector Inject 0.25 mg under the skin 1 (one) time per week. Prescribed by [facility] 1.5 mL 0     No current facility-administered medications on file prior to visit.     Allergies:  Allergies   Allergen Reactions    Clindamycin Other    Erythromycin Base Unknown     severe stomach cramps    Fish Containing Products Other    Sulfa (Sulfonamide Antibiotics) Other    Adhesive Tape-Silicones Rash     Objective     Physicial Exam  Vitals:    24 1506   BP: 117/75   Pulse: 60   Temp: 36.3 °C (97.3 °F)     Body mass index is 25.61 kg/m².    General: Well developed, well nourished, alert and cooperative, appears in no acute distress  Eyes: Non-injected conjunctiva, sclera clear, no proptosis  Cardiac: Extremities are warm and well perfused. No edema, cyanosis or pallor.   Lungs: Breathing is easy, non-labored. Speaking in clear and complete sentences. Normal diaphragmatic movement.  MSK: Ambulatory with steady gait, unassisted  Neuro: alert and oriented to person, place and time  Psych: Demonstrates good judgement and reason, without hallucinations, abnormal affect or abnormal behaviors.  Skin: no obvious lesions, no rashes.  Back: Right sided CVA tenderness    PVR by US: 17mL    Results for orders placed or  performed in visit on 07/29/24 (from the past 24 hour(s))   POCT UA Automated manually resulted   Result Value Ref Range    POC Color, Urine Yellow Straw, Yellow, Light-Yellow    POC Appearance, Urine Clear Clear    POC Glucose, Urine NEGATIVE NEGATIVE mg/dl    POC Bilirubin, Urine NEGATIVE NEGATIVE    POC Ketones, Urine NEGATIVE NEGATIVE mg/dl    POC Specific Gravity, Urine >=1.030 1.005 - 1.035    POC Blood, Urine NEGATIVE NEGATIVE    POC PH, Urine 7.0 No Reference Range Established PH    POC Protein, Urine NEGATIVE NEGATIVE, 30 (1+) mg/dl    POC Urobilinogen, Urine 1.0 0.2, 1.0 EU/DL    Poc Nitrite, Urine NEGATIVE NEGATIVE    POC Leukocytes, Urine NEGATIVE NEGATIVE     Lab Results   Component Value Date    URINECULTURE >100,000 Escherichia coli (A) 07/21/2024    URINECULTURE (A) 06/12/2024     20,000 - 80,000 Klebsiella (Enterobacter) aerogenes    URINECULTURE >100,000 Escherichia coli (A) 02/13/2024    URINECULTURE Culture Comments - See Below 06/19/2023 6/19/23 urine culture no growth     Lab Results   Component Value Date    CREATININE 0.73 06/17/2024    BUN 12 06/17/2024     06/17/2024    K 4.8 06/17/2024     06/17/2024    CO2 24 06/17/2024     US RENAL COMPLETE 7/10/24:  FINDINGS:  The right kidney measures 9.4 cm in length.  The left kidney measures 9.1 cm in length.  There is no shadowing calculus, hydronephrosis, or solid mass  identified. The renal cortical thickness and echogenicity is normal.      Incidental note is made of a 2.1 x 2.3 x 2.7 cm echogenic solid mass  within the right hepatic lobe consistent with hepatic hemangioma.      Cursory evaluation of the urinary bladder shows bilateral ureteral  jets without bladder mass, wall thickening, or calculus.      - Impression -  Normal ultrasound of the kidneys.  2.1 x 2.3 x 2.7 cm hepatic hemangioma.    Review of Systems  All other systems have been reviewed and are negative for complaint.    Assessment and Plan:  Aida Moreau is a  49 y.o. female with recurrent UTI.    > Recurrent UTI: Reviewed urine cultures as above. Meets criteria for recurrent UTI. PVR WNL. Had normal annual gyn exam 7/23/24. Asymptomatic today, just finished course of cipro.   - reviewed anatomical and physiological risk factors for developing UTI (female anatomy, post-menopausal status).   - reviewed that goal is prevention, to avoid antibiotics if possible  - discussed prevention strategies: vaginal estrogen (would defer at this time as patient is not postmenopausal), methenamine, D-mannose, cranberry (36 mg daily of PACs), post coital antibiotics and antibiotic suppression.    - Patient would like to proceed with D-mannose, cranberry and post coital antibiotics initially. If infections not controlled, can consider methenamine vs daily prophylaxis. Discussed D-mannose 2g daily for prevention. We discussed that side effects are rare but some patients may have loose stools or diarrhea. Cranberry tablets 500mg po bid for UTI prevention.    - Based on previous urine cultures and allergies, post coital antibiotic recommendation for nitrofurantoin 100mg po after intercourse. Rx sent to pharmacy. Will plan for 3 month course and reassess.   - Standing order for urine culture placed. When symptomatic, patient can drop off specimen to local  lab. Will follow up on results.   - Recent renal US without evidence of stone or hydro. If infections persist, can consider cystoscopy.     > Chronic right flank pain, history of kidney stones: Patient reports right sided flank pain since removal of stent last September. No change in urinary symptoms. UA negative for hematuria. No systemic symptoms. Normal renal function. Discussed recent renal US without evidence of stones. We discussed CT abdomen/pelvis without contrast is gold standard for evaluation of kidney stones. Discussed given her symptoms, and despite negative renal US, would be reasonable to proceed with CT scan. She'd like to  monitor symptoms at this time. Patient was advised to present to local Emergency Department for development of fevers, chills, nausea, vomiting or flank pain that is not controlled with oral pain medication. Patient voiced understanding and will contact clinic with any change in symptoms.     All questions and concerns were addressed. Patient verbalizes understanding and has no other questions at this time.     Follow up: 3 months for UTI follow up, sooner if needed    Nathalie Guillaume PA-C  07/29/24 3:59 PM  Clinic phone: [phone], [phone]

## 2024-07-29 ENCOUNTER — APPOINTMENT (OUTPATIENT)
Dept: UROLOGY | Facility: CLINIC | Age: 49
End: 2024-07-29
Payer: COMMERCIAL

## 2024-07-29 VITALS
HEART RATE: 60 BPM | TEMPERATURE: 97.3 F | DIASTOLIC BLOOD PRESSURE: 75 MMHG | SYSTOLIC BLOOD PRESSURE: 117 MMHG | HEIGHT: 62 IN | BODY MASS INDEX: 25.76 KG/M2 | WEIGHT: 140 LBS

## 2024-07-29 DIAGNOSIS — N39.0 RECURRENT UTI: Primary | ICD-10-CM

## 2024-07-29 DIAGNOSIS — Z87.442 HISTORY OF KIDNEY STONES: ICD-10-CM

## 2024-07-29 DIAGNOSIS — N39.0 URINARY TRACT INFECTION WITHOUT HEMATURIA, SITE UNSPECIFIED: ICD-10-CM

## 2024-07-29 LAB
POC APPEARANCE, URINE: CLEAR
POC BILIRUBIN, URINE: NEGATIVE
POC BLOOD, URINE: NEGATIVE
POC COLOR, URINE: YELLOW
POC GLUCOSE, URINE: NEGATIVE MG/DL
POC KETONES, URINE: NEGATIVE MG/DL
POC LEUKOCYTES, URINE: NEGATIVE
POC NITRITE,URINE: NEGATIVE
POC PH, URINE: 7 PH
POC PROTEIN, URINE: NEGATIVE MG/DL
POC SPECIFIC GRAVITY, URINE: >=1.03
POC UROBILINOGEN, URINE: 1 EU/DL

## 2024-07-29 PROCEDURE — 1036F TOBACCO NON-USER: CPT | Performed by: PHYSICIAN ASSISTANT

## 2024-07-29 PROCEDURE — 99214 OFFICE O/P EST MOD 30 MIN: CPT | Performed by: PHYSICIAN ASSISTANT

## 2024-07-29 PROCEDURE — 51798 US URINE CAPACITY MEASURE: CPT | Performed by: PHYSICIAN ASSISTANT

## 2024-07-29 PROCEDURE — 81003 URINALYSIS AUTO W/O SCOPE: CPT | Performed by: PHYSICIAN ASSISTANT

## 2024-07-29 PROCEDURE — 3008F BODY MASS INDEX DOCD: CPT | Performed by: PHYSICIAN ASSISTANT

## 2024-07-29 RX ORDER — NITROFURANTOIN 25; 75 MG/1; MG/1
CAPSULE ORAL
Qty: 30 CAPSULE | Refills: 2 | Status: SHIPPED | OUTPATIENT
Start: 2024-07-29

## 2024-07-29 ASSESSMENT — PAIN SCALES - GENERAL: PAINLEVEL: 0-NO PAIN

## 2024-07-29 NOTE — PATIENT INSTRUCTIONS
D-mannose appears to be a promising nutritional supplement that may be an option in preventing UTIs, especially in people who have frequent UTIs. Most people who take it don't experience any side effects.     What is D-mannose?  -D-mannose is a type of sugar that's related to the better-known glucose. These sugars are both simple sugars-- that is, they consist of just one molecule of sugar. As well, both occur naturally in your body and are also found in some plants in the form of starch. Several fruits and vegetables contain D-mannose including: cranberries, apples, oranges, peaches, broccoli, green beans.     How does D-mannose work?  -E.coli bacteria causes 90 percent of UTIs. Once these bacteria enter the urinary tract, they latch on to cells, grow, and cause infection. Researchers think that D-mannose works for a UTI by preventing these bacteria from latching on.    -There have been many studies demonstrating the use of D-mannose has been as effective as antibiotic treatment in the use of prevention of UTIs.    How to use D-mannose?  -D-mannose comes in capsules and powders, and can be purchased from drug [employer] or [employer].  -For preventing frequent UTIs, the dose is 2 grams once daily, or 1 gram twice daily  -Keep in mind that many of the products provide 500mg capsules. This means you may need to take two to four capsules to get the desired dose.    We recommend D-Mannose for preventing frequent UTIs. We recommend 2 g once daily or 1 g twice daily. We discussed that side effects are rare but some patients may have loose stools or diarrhea.        Cranberry: 500mg by mouth twice daily

## 2024-07-31 ENCOUNTER — APPOINTMENT (OUTPATIENT)
Dept: RADIOLOGY | Facility: CLINIC | Age: 49
End: 2024-07-31
Payer: COMMERCIAL

## 2024-08-05 LAB
CYTOLOGY CMNT CVX/VAG CYTO-IMP: NORMAL
HPV HR 12 DNA GENITAL QL NAA+PROBE: NEGATIVE
HPV HR GENOTYPES PNL CVX NAA+PROBE: NEGATIVE
HPV16 DNA SPEC QL NAA+PROBE: NEGATIVE
HPV18 DNA SPEC QL NAA+PROBE: NEGATIVE
LAB AP HPV GENOTYPE QUESTION: YES
LAB AP HPV HR: NORMAL
LABORATORY COMMENT REPORT: NORMAL
PATH REPORT.TOTAL CANCER: NORMAL

## 2024-08-07 ENCOUNTER — HOSPITAL ENCOUNTER (OUTPATIENT)
Dept: RADIOLOGY | Facility: CLINIC | Age: 49
Discharge: HOME | End: 2024-08-07
Payer: COMMERCIAL

## 2024-08-07 VITALS — HEIGHT: 62 IN | WEIGHT: 139.99 LBS | BODY MASS INDEX: 25.76 KG/M2

## 2024-08-07 DIAGNOSIS — Z12.31 ENCOUNTER FOR SCREENING MAMMOGRAM FOR BREAST CANCER: ICD-10-CM

## 2024-08-07 PROCEDURE — 77067 SCR MAMMO BI INCL CAD: CPT

## 2024-08-07 PROCEDURE — 77063 BREAST TOMOSYNTHESIS BI: CPT | Performed by: STUDENT IN AN ORGANIZED HEALTH CARE EDUCATION/TRAINING PROGRAM

## 2024-08-07 PROCEDURE — 77067 SCR MAMMO BI INCL CAD: CPT | Performed by: STUDENT IN AN ORGANIZED HEALTH CARE EDUCATION/TRAINING PROGRAM

## 2024-10-28 ENCOUNTER — APPOINTMENT (OUTPATIENT)
Dept: NEPHROLOGY | Facility: CLINIC | Age: 49
End: 2024-10-28
Payer: COMMERCIAL

## 2024-10-28 DIAGNOSIS — N20.0 NEPHROLITHIASIS: Primary | ICD-10-CM

## 2024-10-28 PROCEDURE — 99441 PR PHYS/QHP TELEPHONE EVALUATION 5-10 MIN: CPT | Performed by: INTERNAL MEDICINE

## 2024-10-29 ENCOUNTER — APPOINTMENT (OUTPATIENT)
Dept: UROLOGY | Facility: CLINIC | Age: 49
End: 2024-10-29
Payer: COMMERCIAL

## 2024-11-11 ENCOUNTER — APPOINTMENT (OUTPATIENT)
Dept: NEPHROLOGY | Facility: CLINIC | Age: 49
End: 2024-11-11
Payer: COMMERCIAL

## 2024-12-04 ENCOUNTER — OFFICE VISIT (OUTPATIENT)
Dept: URGENT CARE | Age: 49
End: 2024-12-04
Payer: COMMERCIAL

## 2024-12-04 DIAGNOSIS — J22 ACUTE LOWER RESPIRATORY TRACT INFECTION: ICD-10-CM

## 2024-12-04 DIAGNOSIS — J01.40 ACUTE PANSINUSITIS, RECURRENCE NOT SPECIFIED: Primary | ICD-10-CM

## 2024-12-04 RX ORDER — BENZONATATE 100 MG/1
100 CAPSULE ORAL 3 TIMES DAILY PRN
Qty: 42 CAPSULE | Refills: 0 | Status: SHIPPED | OUTPATIENT
Start: 2024-12-04 | End: 2025-01-03

## 2024-12-04 RX ORDER — AMOXICILLIN AND CLAVULANATE POTASSIUM 875; 125 MG/1; MG/1
1 TABLET, FILM COATED ORAL 2 TIMES DAILY
Qty: 28 TABLET | Refills: 0 | Status: SHIPPED | OUTPATIENT
Start: 2024-12-04 | End: 2024-12-18

## 2024-12-04 RX ORDER — METHYLPREDNISOLONE 4 MG/1
TABLET ORAL
Qty: 21 TABLET | Refills: 0 | Status: SHIPPED | OUTPATIENT
Start: 2024-12-04 | End: 2024-12-10

## 2024-12-04 RX ORDER — ALBUTEROL SULFATE 90 UG/1
2 INHALANT RESPIRATORY (INHALATION) EVERY 4 HOURS PRN
Qty: 6.7 G | Refills: 0 | Status: SHIPPED | OUTPATIENT
Start: 2024-12-04 | End: 2025-12-04

## 2024-12-04 RX ORDER — AZITHROMYCIN 250 MG/1
TABLET, FILM COATED ORAL
Qty: 6 TABLET | Refills: 0 | Status: SHIPPED | OUTPATIENT
Start: 2024-12-04 | End: 2024-12-09

## 2024-12-04 ASSESSMENT — ENCOUNTER SYMPTOMS
SINUS PAIN: 1
APPETITE CHANGE: 0
SINUS COMPLAINT: 1
CHEST TIGHTNESS: 1
SINUS PRESSURE: 1
ACTIVITY CHANGE: 0
UNEXPECTED WEIGHT CHANGE: 0
COUGH: 1
FEVER: 0
FATIGUE: 1
CHILLS: 1

## 2024-12-04 NOTE — PROGRESS NOTES
recently please go to lab please call in a prescription for Flagyl 500 mg by mouth twice a day for 7 days to a standing order was ordered.  Prior Subjective   Patient ID: Aida Moreau is a 49 y.o. female. They present today with a chief complaint of Sinus Problem (Pressure  left ear , teeth  4 days).    History of Present Illness  patient has been ill for almost a week with nasal congestion and a productive cough.      History provided by:  Patient   used: No    Sinus Problem  Associated symptoms: congestion, cough and fatigue    Associated symptoms: no fever        Past Medical History  Allergies as of 2024 - Reviewed 2024   Allergen Reaction Noted    Clindamycin Other 2023    Erythromycin base Unknown 10/04/2002    Fish containing products Other 2023    Sulfa (sulfonamide antibiotics) Other 2023    Adhesive tape-silicones Rash 2022       (Not in a hospital admission)       Past Medical History:   Diagnosis Date    History of PCOS     Personal history of other diseases of the respiratory system 2020    History of sore throat       Past Surgical History:   Procedure Laterality Date    OTHER SURGICAL HISTORY  2021    Endometrial ablation    OTHER SURGICAL HISTORY  2021    Tubal ligation    OTHER SURGICAL HISTORY  2021     section    OTHER SURGICAL HISTORY  2021    Foot surgery        reports that she has never smoked. She has never used smokeless tobacco. She reports current alcohol use. She reports that she does not currently use drugs.    Review of Systems  Review of Systems   Constitutional:  Positive for chills and fatigue. Negative for activity change, appetite change, fever and unexpected weight change.   HENT:  Positive for congestion, sinus pressure and sinus pain.    Respiratory:  Positive for cough and chest tightness.                                   Objective    There were no vitals filed for this  visit.  No LMP recorded. Patient has had an ablation.    Physical Exam  Vitals and nursing note reviewed.   Constitutional:       Appearance: Normal appearance.      Comments: Alert pleasant and cooperative 49-year-old female who appears tired   HENT:      Right Ear: Tympanic membrane and external ear normal.      Left Ear: Tympanic membrane, ear canal and external ear normal.      Nose: Congestion present.      Comments: Copious nasal congestion.  Sinuses are very tender to percussion right side greater than left     Mouth/Throat:      Mouth: Mucous membranes are moist.      Pharynx: Posterior oropharyngeal erythema present. No oropharyngeal exudate.   Eyes:      Extraocular Movements: Extraocular movements intact.      Conjunctiva/sclera: Conjunctivae normal.      Pupils: Pupils are equal, round, and reactive to light.   Cardiovascular:      Rate and Rhythm: Normal rate and regular rhythm.   Pulmonary:      Effort: Pulmonary effort is normal. No respiratory distress.      Breath sounds: No stridor. Wheezing present. No rhonchi or rales.      Comments: Mild expiratory wheezing on the left greater than right.  Musculoskeletal:         General: Normal range of motion.   Skin:     General: Skin is warm and dry.   Neurological:      General: No focal deficit present.      Mental Status: She is alert and oriented to person, place, and time.   Psychiatric:         Mood and Affect: Mood normal.         Behavior: Behavior normal.         Procedures    Point of Care Test & Imaging Results from this visit  No results found for this visit on 12/04/24.   No results found.    Diagnostic study results (if any) were reviewed by Meagan Ch PA-C.    Assessment/Plan   Allergies, medications, history, and pertinent labs/EKGs/Imaging reviewed by Meagan Ch PA-C.     Medical Decision Making  History and physical examination are consistent with viral and acute sinusitis and bronchitis.  Will treat the sinusitis with  Augmentin and because of the prevalence of atypical pneumonia in the area recently will treat the lower respiratory tract infection with Zithromax.  Will also treat with Medrol Dosepak and inhaler and Tessalon Perles to help reduce inflammation in the sinuses and bronchial tubes and help reduce her coughing.  Suggested that she also use Flonase and saline nasal spray to keep her sinuses draining.  We discussed chest x-ray.  I explained that whether she has pneumonia or bronchitis the treatment is going to be Zithromax as the antibiotic.  She feels that since x-ray is not going to influence the choice of antibiotic it is not necessary at this time unless she has no improvement over the next couple of days.  If she feels worse instead of improved especially if she develops any chest pain shortness of breath high fevers or any other concerning symptoms it it would be appropriate to follow-up in the emergency department  She was also given a note for off work tomorrow.      Orders and Diagnoses  There are no diagnoses linked to this encounter.    Medical Admin Record      Patient disposition: Home    Electronically signed by Meagan Ch PA-C  5:09 PM

## 2024-12-04 NOTE — LETTER
December 4, 2024     Patient: Aida Moreau   YOB: 1975   Date of Visit: 12/4/2024       To Whom It May Concern:    Aida Moreau was seen in my clinic on 12/4/2024 at 4:45 pm. Please excuse Aida for her absence from work on this day to make the appointment. May return to work on 12/6/24.    If you have any questions or concerns, please don't hesitate to call.         Sincerely,         Meagan Ch PA-C        CC: No Recipients

## 2025-01-03 ENCOUNTER — APPOINTMENT (OUTPATIENT)
Dept: GASTROENTEROLOGY | Facility: HOSPITAL | Age: 50
End: 2025-01-03
Payer: COMMERCIAL

## 2025-04-09 ENCOUNTER — APPOINTMENT (OUTPATIENT)
Dept: PRIMARY CARE | Facility: CLINIC | Age: 50
End: 2025-04-09
Payer: COMMERCIAL

## 2025-04-09 VITALS
WEIGHT: 140 LBS | SYSTOLIC BLOOD PRESSURE: 120 MMHG | HEART RATE: 69 BPM | OXYGEN SATURATION: 98 % | DIASTOLIC BLOOD PRESSURE: 70 MMHG | HEIGHT: 62 IN | BODY MASS INDEX: 25.76 KG/M2

## 2025-04-09 DIAGNOSIS — E78.5 HYPERLIPIDEMIA, UNSPECIFIED HYPERLIPIDEMIA TYPE: ICD-10-CM

## 2025-04-09 DIAGNOSIS — Z12.11 ENCOUNTER FOR SCREENING FOR MALIGNANT NEOPLASM OF COLON: ICD-10-CM

## 2025-04-09 DIAGNOSIS — E55.9 VITAMIN D DEFICIENCY: ICD-10-CM

## 2025-04-09 DIAGNOSIS — R73.03 PREDIABETES: ICD-10-CM

## 2025-04-09 DIAGNOSIS — N95.1 PERIMENOPAUSE: ICD-10-CM

## 2025-04-09 DIAGNOSIS — E55.9 VITAMIN D INSUFFICIENCY: ICD-10-CM

## 2025-04-09 DIAGNOSIS — Z13.29 SCREENING FOR THYROID DISORDER: ICD-10-CM

## 2025-04-09 DIAGNOSIS — Z00.00 ANNUAL PHYSICAL EXAM: Primary | ICD-10-CM

## 2025-04-09 PROCEDURE — 99396 PREV VISIT EST AGE 40-64: CPT | Performed by: FAMILY MEDICINE

## 2025-04-09 PROCEDURE — 99213 OFFICE O/P EST LOW 20 MIN: CPT | Performed by: FAMILY MEDICINE

## 2025-04-09 PROCEDURE — 1036F TOBACCO NON-USER: CPT | Performed by: FAMILY MEDICINE

## 2025-04-09 PROCEDURE — 3008F BODY MASS INDEX DOCD: CPT | Performed by: FAMILY MEDICINE

## 2025-04-09 ASSESSMENT — PATIENT HEALTH QUESTIONNAIRE - PHQ9
4. FEELING TIRED OR HAVING LITTLE ENERGY: SEVERAL DAYS
7. TROUBLE CONCENTRATING ON THINGS, SUCH AS READING THE NEWSPAPER OR WATCHING TELEVISION: SEVERAL DAYS
SUM OF ALL RESPONSES TO PHQ9 QUESTIONS 1 AND 2: 2
2. FEELING DOWN, DEPRESSED OR HOPELESS: SEVERAL DAYS
1. LITTLE INTEREST OR PLEASURE IN DOING THINGS: SEVERAL DAYS
10. IF YOU CHECKED OFF ANY PROBLEMS, HOW DIFFICULT HAVE THESE PROBLEMS MADE IT FOR YOU TO DO YOUR WORK, TAKE CARE OF THINGS AT HOME, OR GET ALONG WITH OTHER PEOPLE: NOT DIFFICULT AT ALL
3. TROUBLE FALLING OR STAYING ASLEEP OR SLEEPING TOO MUCH: SEVERAL DAYS
8. MOVING OR SPEAKING SO SLOWLY THAT OTHER PEOPLE COULD HAVE NOTICED. OR THE OPPOSITE, BEING SO FIGETY OR RESTLESS THAT YOU HAVE BEEN MOVING AROUND A LOT MORE THAN USUAL: SEVERAL DAYS
6. FEELING BAD ABOUT YOURSELF - OR THAT YOU ARE A FAILURE OR HAVE LET YOURSELF OR YOUR FAMILY DOWN: SEVERAL DAYS
5. POOR APPETITE OR OVEREATING: SEVERAL DAYS
9. THOUGHTS THAT YOU WOULD BE BETTER OFF DEAD, OR OF HURTING YOURSELF: NOT AT ALL
SUM OF ALL RESPONSES TO PHQ QUESTIONS 1-9: 8

## 2025-04-09 ASSESSMENT — ANXIETY QUESTIONNAIRES
4. TROUBLE RELAXING: SEVERAL DAYS
3. WORRYING TOO MUCH ABOUT DIFFERENT THINGS: SEVERAL DAYS
1. FEELING NERVOUS, ANXIOUS, OR ON EDGE: SEVERAL DAYS
7. FEELING AFRAID AS IF SOMETHING AWFUL MIGHT HAPPEN: SEVERAL DAYS
2. NOT BEING ABLE TO STOP OR CONTROL WORRYING: SEVERAL DAYS
5. BEING SO RESTLESS THAT IT IS HARD TO SIT STILL: SEVERAL DAYS
6. BECOMING EASILY ANNOYED OR IRRITABLE: SEVERAL DAYS
IF YOU CHECKED OFF ANY PROBLEMS ON THIS QUESTIONNAIRE, HOW DIFFICULT HAVE THESE PROBLEMS MADE IT FOR YOU TO DO YOUR WORK, TAKE CARE OF THINGS AT HOME, OR GET ALONG WITH OTHER PEOPLE: NOT DIFFICULT AT ALL
GAD7 TOTAL SCORE: 7

## 2025-04-09 NOTE — PROGRESS NOTES
"  Subjective   Patient ID: Aida Moreau is a 49 y.o. female who presents for Annual Exam.    HPI   She would like to have blood work.  Her last PAP smear was 2024, will have a new one next 08/2025.   She had a colonoscopy scheduled which was cancelled. She is well aware that she is over due for first colonoscopy. She denies family history of colon cancer.    Her last labs noted her glucose at 105, her last A1C was 5.5 last 2022. She reports using 2.4 mg semaglutide every other week for weight loss. She has family history of diabetes mellitus  type 1 and type 2. She would like to know if she should repeat an A1C lab test.   She has lost 35 Lbs with the use of semaglutide, and would like to know if she can have the prescription for semaglutide.     She reports that she is concerned about perimenopausal symptoms such ass hair thinning and hip discomfort which she has to ''pop back in'', she had an ablation last 2019 and since that she has not had a menstrual cycle.  Review of Systems    Objective   /70   Pulse 69   Ht 1.575 m (5' 2\")   Wt 63.5 kg (140 lb)   SpO2 98%   BMI 25.61 kg/m²     Physical Exam  Constitutional:       Appearance: Normal appearance.   HENT:      Head: Normocephalic and atraumatic.   Cardiovascular:      Rate and Rhythm: Normal rate and regular rhythm.      Pulses: Normal pulses.      Heart sounds: Normal heart sounds.   Pulmonary:      Effort: Pulmonary effort is normal.      Breath sounds: Normal breath sounds.   Abdominal:      General: Bowel sounds are normal.      Palpations: Abdomen is soft. There is no mass.      Tenderness: There is no abdominal tenderness.   Musculoskeletal:         General: Normal range of motion.      Cervical back: Normal range of motion and neck supple.      Right lower leg: No edema.      Left lower leg: No edema.   Skin:     General: Skin is warm and dry.   Neurological:      General: No focal deficit present.      Mental Status: She is alert and " oriented to person, place, and time.   Psychiatric:         Mood and Affect: Mood normal.         Thought Content: Thought content normal.         Judgment: Judgment normal.         Assessment/Plan   Diagnoses and all orders for this visit:  Annual physical exam  -     CBC; Future  -     Comprehensive Metabolic Panel; Future  -     Lipid Panel; Future  -     TSH with reflex to Free T4 if abnormal; Future  Perimenopause  Vitamin D insufficiency  Prediabetes  -     Hemoglobin A1C; Future  Vitamin D deficiency  -     Vitamin D 25-Hydroxy,Total (for eval of Vitamin D levels); Future  Screening for thyroid disorder  -     TSH with reflex to Free T4 if abnormal; Future  Encounter for screening for malignant neoplasm of colon  -     Colonoscopy Screening; Average Risk Patient; Future  Recommended schedule colonoscopy for  summer when out of school, ordered today.  Recommended new lab tests, will call with results  Discussed GLP-1 and staying on med and ins coverage etc  Discussed diet and exercise  Discussed perimenopausal s/s.  Seeing GYN, Dr Phan regularly  1 year follow up or prn sooner.     Scribe Attestation  By signing my name below, IDarrick , Sumanth   attest that this documentation has been prepared under the direction and in the presence of Josiane Castaneda DO.

## 2025-06-05 ENCOUNTER — OFFICE VISIT (OUTPATIENT)
Dept: URGENT CARE | Age: 50
End: 2025-06-05
Payer: COMMERCIAL

## 2025-06-05 VITALS
DIASTOLIC BLOOD PRESSURE: 80 MMHG | SYSTOLIC BLOOD PRESSURE: 119 MMHG | BODY MASS INDEX: 25.6 KG/M2 | TEMPERATURE: 98.1 F | RESPIRATION RATE: 18 BRPM | HEART RATE: 61 BPM | WEIGHT: 139.99 LBS | OXYGEN SATURATION: 99 %

## 2025-06-05 DIAGNOSIS — H00.025 HORDEOLUM INTERNUM LEFT LOWER EYELID: Primary | ICD-10-CM

## 2025-06-05 RX ORDER — AMOXICILLIN AND CLAVULANATE POTASSIUM 875; 125 MG/1; MG/1
875 TABLET, FILM COATED ORAL 2 TIMES DAILY
Qty: 14 TABLET | Refills: 0 | Status: SHIPPED | OUTPATIENT
Start: 2025-06-05 | End: 2025-06-12

## 2025-06-05 ASSESSMENT — ENCOUNTER SYMPTOMS
EYE ITCHING: 0
EYE REDNESS: 0
EYE DISCHARGE: 0
CONSTITUTIONAL NEGATIVE: 1
EYE PAIN: 0

## 2025-06-05 NOTE — PROGRESS NOTES
Subjective   Patient ID: Aida Moreau is a 50 y.o. female. They present today with a chief complaint of swollen eye (Left eye swollen and irritated.).    History of Present Illness  50-year-old female presents clinic today with complaints of a lesion to the left lower eyelid.  Patient states has been ongoing for couple days.  She states she has a history of styes in the past.  She denies any visual changes.  Denies fevers or chills.          Past Medical History  Allergies as of 06/05/2025 - Reviewed 04/09/2025   Allergen Reaction Noted    Clindamycin Other 06/19/2023    Erythromycin base Unknown 10/04/2002    Fish containing products Other 06/19/2023    Sulfa (sulfonamide antibiotics) Other 06/19/2023    Adhesive tape-silicones Rash 07/28/2022       Prescriptions Prior to Admission[1]     Medical History[2]    Surgical History[3]     reports that she has never smoked. She has never used smokeless tobacco. She reports current alcohol use. She reports that she does not use drugs.    Review of Systems  Review of Systems   Constitutional: Negative.    Eyes:  Negative for pain, discharge, redness and itching.                                  Objective    Vitals:    06/05/25 0821   BP: 119/80   Pulse: 61   Resp: 18   Temp: 36.7 °C (98.1 °F)   SpO2: 99%   Weight: 63.5 kg (139 lb 15.9 oz)     No LMP recorded. Patient has had an ablation.    Physical Exam  Constitutional:       Appearance: Normal appearance.   Eyes:      General:         Left eye: Hordeolum present.     Extraocular Movements: Extraocular movements intact.      Conjunctiva/sclera: Conjunctivae normal.      Pupils: Pupils are equal, round, and reactive to light.     Neurological:      Mental Status: She is alert.         Procedures    Point of Care Test & Imaging Results from this visit  No results found for this visit on 06/05/25.   Imaging  No results found.    Cardiology, Vascular, and Other Imaging  No other imaging results found for the past 2  days      Diagnostic study results (if any) were reviewed by José Siddiqui PA-C.    Assessment/Plan   Allergies, medications, history, and pertinent labs/EKGs/Imaging reviewed by José Siddiqui PA-C.     Medical Decision Making  Patient pleasant cooperative on examination.    On examination there is a noted stye to the left lower eyelid.  There is no significant surrounding erythema concerning for a cellulitis.  Patient states in the past she was giving an oral antibiotic for this.  Per chart review I do see that she was diagnosed with a stye along with a periorbital cellulitis.  I do not appreciate any signs of that at this time.    Patient still requesting an oral antibiotic.    I did start patient on Augmentin.  I advised her to do warm compresses and monitor for persistent signs.  If this occurs please follow-up with optometry.  If anything were to worsen please go to the emergency room for further evaluation.    Patient agreement with plan.    Patient alert and oriented, no acute stress upon discharge.    Orders and Diagnoses  There are no diagnoses linked to this encounter.    Medical Admin Record      Patient disposition: Home    Electronically signed by José Siddiqui PA-C  8:26 AM           [1] (Not in a hospital admission)  [2]   Past Medical History:  Diagnosis Date    History of PCOS     Personal history of other diseases of the respiratory system 2020    History of sore throat   [3]   Past Surgical History:  Procedure Laterality Date    OTHER SURGICAL HISTORY  2021    Endometrial ablation    OTHER SURGICAL HISTORY  2021    Tubal ligation    OTHER SURGICAL HISTORY  2021     section    OTHER SURGICAL HISTORY  2021    Foot surgery